# Patient Record
Sex: MALE | Race: ASIAN | NOT HISPANIC OR LATINO | Employment: FULL TIME | URBAN - METROPOLITAN AREA
[De-identification: names, ages, dates, MRNs, and addresses within clinical notes are randomized per-mention and may not be internally consistent; named-entity substitution may affect disease eponyms.]

---

## 2019-05-01 ENCOUNTER — OFFICE VISIT (OUTPATIENT)
Dept: FAMILY MEDICINE | Facility: CLINIC | Age: 34
End: 2019-05-01

## 2019-05-01 VITALS
WEIGHT: 172 LBS | HEART RATE: 71 BPM | BODY MASS INDEX: 27 KG/M2 | HEIGHT: 67 IN | SYSTOLIC BLOOD PRESSURE: 110 MMHG | TEMPERATURE: 97.7 F | OXYGEN SATURATION: 98 % | DIASTOLIC BLOOD PRESSURE: 70 MMHG

## 2019-05-01 DIAGNOSIS — R05.9 COUGH: ICD-10-CM

## 2019-05-01 DIAGNOSIS — J20.9 ACUTE BRONCHITIS WITH SYMPTOMS > 10 DAYS: Primary | ICD-10-CM

## 2019-05-01 DIAGNOSIS — K62.5 RECTAL BLEEDING: ICD-10-CM

## 2019-05-01 DIAGNOSIS — H10.33 ACUTE CONJUNCTIVITIS OF BOTH EYES, UNSPECIFIED ACUTE CONJUNCTIVITIS TYPE: ICD-10-CM

## 2019-05-01 PROCEDURE — 99203 OFFICE O/P NEW LOW 30 MIN: CPT | Performed by: NURSE PRACTITIONER

## 2019-05-01 RX ORDER — CODEINE PHOSPHATE AND GUAIFENESIN 10; 100 MG/5ML; MG/5ML
1-2 SOLUTION ORAL EVERY 4 HOURS PRN
Qty: 180 ML | Refills: 0 | Status: SHIPPED | OUTPATIENT
Start: 2019-05-01 | End: 2019-10-28

## 2019-05-01 RX ORDER — PREDNISONE 20 MG/1
20 TABLET ORAL DAILY
Qty: 5 TABLET | Refills: 0 | Status: SHIPPED | OUTPATIENT
Start: 2019-05-01 | End: 2019-05-06

## 2019-05-01 ASSESSMENT — MIFFLIN-ST. JEOR: SCORE: 1678.82

## 2019-05-01 NOTE — PROGRESS NOTES
SUBJECTIVE:                                                      Leonel Alvarez is a 33 year old male who presents to clinic today for the following health issues:    Acute Illness   Acute illness concerns: cough   Onset: 2 weeks     Fever: YES- subjective - last week     Chills/Sweats: YES- especially at night     Headache (location?): no    Sinus Pressure:no    Conjunctivitis:  no    Ear Pain: no    Rhinorrhea: no    Congestion: no    Sore Throat: YES     Cough: YES-non-productive    Wheeze: no    Decreased Appetite: no    Nausea: no    Vomiting: no    Diarrhea:  no    Dysuria/Freq.: no    Fatigue/Achiness: no    Sick/Strep Exposure: no     Therapies Tried and outcome: nyquil, severe cold and flu, warm water salt gargle      HPI: Leonel presents today, as a new patient, with three distinct complaints.    1) Respiratory illness: About two weeks ago, he developed a febrile illness with upper respiratory symptoms and cough. Most of his other symptoms have spontaneously resolved, but he is left with a persistent, aggressive cough (especially at night). He denies wheeze or shortness of breath. The cough is non-productive.     2) Red eye: About three days ago, he developed red eye bilaterally. Vision is intact. No eye pain. He endorses yellow discharge. He eyes are crusted shut each morning. He states that this has improved on it own (with application of warm compresses).     3) Rectal bleeding: For his last 2-3 bowel movements, he has noted a few drops of blood falling into the toilet at the end of his BM. Nothing on the tissue. Nothing streaking the stool. Bright red drops. No anal itching or pain or palpable mass / tag. He does have a history of hemorrhoids and possible fissure / adhesion, but he has not been bothered by these since moving here from [place] about one year ago. Denies personal or family history of colorectal cancer. No fever, chills, nausea, vomiting. No issues with constipation since moving here.  "    Problem list and histories reviewed & adjusted, as indicated.  Additional history: as documented    Reviewed and updated as needed this visit by clinical staff  Tobacco  Allergies  Meds  Med Hx  Surg Hx  Fam Hx  Soc Hx      Reviewed and updated as needed this visit by Provider  Tobacco  Med Hx  Surg Hx  Fam Hx  Soc Hx        ROS:  Constitutional, HEENT, pulmonary, GI, neuro, skin systems are negative, except as otherwise noted.    OBJECTIVE:                                                      /70 (BP Location: Left arm, Patient Position: Chair, Cuff Size: Adult Regular)   Pulse 71   Temp 97.7  F (36.5  C) (Tympanic)   Ht 1.702 m (5' 7\")   Wt 78 kg (172 lb)   SpO2 98%   BMI 26.94 kg/m   Body mass index is 26.94 kg/m .   GENERAL: healthy, alert, well nourished, well hydrated, no distress  EYES: Eyes grossly normal to inspection, extraocular movements - intact, and PERRL  HENT: ear canals- normal; TMs- normal; Nose- normal; Mouth- no ulcers, no lesions  NECK: no tenderness, no adenopathy, no asymmetry, no masses, no stiffness; thyroid- normal to palpation  RESP: lungs clear to auscultation - no rales, no rhonchi, no wheezes. Chest excursion, respiratory rate, and ventilatory effort / ease within normal limits. No indicators of respiratory distress. Aggressive cough.  CV: regular rates and rhythm, normal S1 S2, no S3 or S4 and no murmur, no click or rub -  NEURO: strength and tone- normal, sensory exam- grossly normal, mentation- intact, speech- normal, reflexes- symmetric  RECTAL- male: likely skin adhesion over inferior anal opening. Possible small external hemorrhoid at 9 o'clock. No bleeding or other noted complication.    Diagnostic test results:  none     ASSESSMENT/PLAN:                                                      Leonel was seen today for cough.    Diagnoses and all orders for this visit:    Acute bronchitis with symptoms > 10 days  Comment: Unlikely to be driven by " infectious process. This is likely a normal sequela of URI, which has nearly resolved. Will treat cough with Robitussin-AC and order low dose, short course of prednisone for airway inflammation.  -     predniSONE (DELTASONE) 20 MG tablet; Take 20 mg by mouth daily for 5 days.    Cough  Comment: See above   -     guaiFENesin-codeine (ROBITUSSIN AC) 100-10 MG/5ML solution; Take 5-10 mLs by mouth every 4 hours as needed for cough    Rectal bleeding  Comment: Possibly related to small external hemorrhoid or a tear in the skin bridge / adhesion noted across inferior aspect of anal opening (linking 7 o'clock to 5 o'clock). Reassuring that he has only noted a few drops of blood 2-3 times. Will watch and wait for now. If this recurs (or persists), or if he experiences ongoing problems related to prior issues with hemorrhoids or presumed fissure, will order colorectal surgery referral.     Acute conjunctivitis of both eyes, unspecified acute conjunctivitis type  Comment: Seemingly, this is resolving on its own. He agrees to call if the redness, irritation, or mattering intensifies once again, at which point we can start an antibiotic drop.     Discussed reasons to call or return to clinic. Leonel acknowledges and demonstrates understanding of circumstances under which care should be sought urgently or emergently. Follow up as discussed. Discussed risks, benefits, alternatives, potential side effects, and proper administration of new medication / treatment. Agrees with plan of care. All questions answered.       Follow-Up: Return in about 1 month (around 5/29/2019) for persistent or worsening symptoms.    See Patient Instructions      Iam Saunders, APRN, CNP

## 2019-05-01 NOTE — PATIENT INSTRUCTIONS
Systane for dry eye  Prednisone for 5 days for airway inflammation  Robitussin-AC for cough    Let me know if your eyes start acting infected again    Watch your bowel habits and let me know if rectal bleeding or pain resumes

## 2020-03-11 ENCOUNTER — HEALTH MAINTENANCE LETTER (OUTPATIENT)
Age: 35
End: 2020-03-11

## 2021-01-03 ENCOUNTER — HEALTH MAINTENANCE LETTER (OUTPATIENT)
Age: 36
End: 2021-01-03

## 2021-04-25 ENCOUNTER — HEALTH MAINTENANCE LETTER (OUTPATIENT)
Age: 36
End: 2021-04-25

## 2021-10-10 ENCOUNTER — HEALTH MAINTENANCE LETTER (OUTPATIENT)
Age: 36
End: 2021-10-10

## 2022-05-21 ENCOUNTER — HEALTH MAINTENANCE LETTER (OUTPATIENT)
Age: 37
End: 2022-05-21

## 2022-09-18 ENCOUNTER — HEALTH MAINTENANCE LETTER (OUTPATIENT)
Age: 37
End: 2022-09-18

## 2023-06-04 ENCOUNTER — HEALTH MAINTENANCE LETTER (OUTPATIENT)
Age: 38
End: 2023-06-04